# Patient Record
Sex: MALE | Race: WHITE
[De-identification: names, ages, dates, MRNs, and addresses within clinical notes are randomized per-mention and may not be internally consistent; named-entity substitution may affect disease eponyms.]

---

## 2019-10-22 VITALS — DIASTOLIC BLOOD PRESSURE: 82 MMHG | SYSTOLIC BLOOD PRESSURE: 145 MMHG

## 2020-01-16 ENCOUNTER — HOSPITAL ENCOUNTER (OUTPATIENT)
Dept: HOSPITAL 61 - RAD | Age: 50
Discharge: HOME | End: 2020-01-16
Attending: NEUROLOGICAL SURGERY
Payer: COMMERCIAL

## 2020-01-16 DIAGNOSIS — M43.22: Primary | ICD-10-CM

## 2020-01-16 PROCEDURE — 72040 X-RAY EXAM NECK SPINE 2-3 VW: CPT

## 2020-01-16 NOTE — RAD
EXAM: Cervical spine, 3 views.

 

HISTORY: Fusion.

 

COMPARISON: None.

 

FINDINGS: 3 views of the cervical spine are obtained. There is 

instrumented anterior spinal fusion and interbody fusion at C5-C6. There 

is no listhesis. The vertebral bodies are normal in height and the 

nonfused disc spaces are preserved.

 

IMPRESSION: Instrumented fusion at C5-C6.

 

Electronically signed by: Debbie Lin MD (1/16/2020 12:08 PM) [facility]